# Patient Record
Sex: MALE | Race: WHITE | NOT HISPANIC OR LATINO | Employment: OTHER | URBAN - METROPOLITAN AREA
[De-identification: names, ages, dates, MRNs, and addresses within clinical notes are randomized per-mention and may not be internally consistent; named-entity substitution may affect disease eponyms.]

---

## 2017-02-23 ENCOUNTER — TELEPHONE (OUTPATIENT)
Dept: PAIN MEDICINE | Facility: CLINIC | Age: 57
End: 2017-02-23

## 2017-02-23 NOTE — TELEPHONE ENCOUNTER
----- Message from Martha Phelps sent at 2/22/2017 11:46 AM CST -----  Patient is requesting a sooner appt than i can offer please call [phone] (home)

## 2017-03-22 ENCOUNTER — OFFICE VISIT (OUTPATIENT)
Dept: PAIN MEDICINE | Facility: CLINIC | Age: 57
End: 2017-03-22
Payer: MEDICARE

## 2017-03-22 VITALS
RESPIRATION RATE: 18 BRPM | DIASTOLIC BLOOD PRESSURE: 67 MMHG | WEIGHT: 197 LBS | HEART RATE: 54 BPM | SYSTOLIC BLOOD PRESSURE: 103 MMHG | HEIGHT: 73 IN | BODY MASS INDEX: 26.11 KG/M2

## 2017-03-22 DIAGNOSIS — G89.4 CHRONIC PAIN DISORDER: ICD-10-CM

## 2017-03-22 DIAGNOSIS — M96.1 POSTLAMINECTOMY SYNDROME OF LUMBAR REGION: ICD-10-CM

## 2017-03-22 DIAGNOSIS — F11.20 UNCOMPLICATED OPIOID DEPENDENCE: ICD-10-CM

## 2017-03-22 DIAGNOSIS — M51.36 DDD (DEGENERATIVE DISC DISEASE), LUMBAR: Primary | ICD-10-CM

## 2017-03-22 PROCEDURE — 99204 OFFICE O/P NEW MOD 45 MIN: CPT | Mod: S$GLB,,, | Performed by: ANESTHESIOLOGY

## 2017-03-22 PROCEDURE — 1160F RVW MEDS BY RX/DR IN RCRD: CPT | Mod: S$GLB,,, | Performed by: ANESTHESIOLOGY

## 2017-03-22 PROCEDURE — 99999 PR PBB SHADOW E&M-EST. PATIENT-LVL III: CPT | Mod: PBBFAC,,, | Performed by: ANESTHESIOLOGY

## 2017-03-22 RX ORDER — NITROGLYCERIN 0.4 MG/1
TABLET SUBLINGUAL
Refills: 99 | COMMUNITY
Start: 2016-12-20 | End: 2019-02-06 | Stop reason: CLARIF

## 2017-03-22 RX ORDER — METOPROLOL TARTRATE 50 MG/1
TABLET ORAL
Refills: 3 | COMMUNITY
Start: 2017-03-20

## 2017-03-22 RX ORDER — LISINOPRIL 10 MG/1
TABLET ORAL
Refills: 3 | COMMUNITY
Start: 2017-03-20 | End: 2018-07-26

## 2017-03-22 RX ORDER — OXYCODONE AND ACETAMINOPHEN 10; 325 MG/1; MG/1
1 TABLET ORAL EVERY 8 HOURS PRN
Qty: 90 TABLET | Refills: 0 | Status: SHIPPED | OUTPATIENT
Start: 2017-03-22 | End: 2017-04-21

## 2017-03-22 RX ORDER — CLONAZEPAM 1 MG/1
1 TABLET ORAL 3 TIMES DAILY
Refills: 0 | COMMUNITY
Start: 2017-01-03 | End: 2018-07-26

## 2017-03-22 RX ORDER — OXYCODONE HYDROCHLORIDE 30 MG/1
30 TABLET ORAL 4 TIMES DAILY
Refills: 0 | COMMUNITY
Start: 2017-01-03 | End: 2017-03-22

## 2017-03-22 RX ORDER — SIMVASTATIN 80 MG/1
TABLET, FILM COATED ORAL
Refills: 3 | COMMUNITY
Start: 2017-03-20

## 2017-03-22 RX ORDER — POTASSIUM CHLORIDE 750 MG/1
TABLET, EXTENDED RELEASE ORAL
Refills: 3 | COMMUNITY
Start: 2017-03-20

## 2017-03-22 RX ORDER — CLOPIDOGREL BISULFATE 75 MG/1
TABLET ORAL
Refills: 3 | COMMUNITY
Start: 2017-03-20

## 2017-03-22 RX ORDER — ALPRAZOLAM 2 MG/1
2 TABLET ORAL 3 TIMES DAILY PRN
COMMUNITY
End: 2018-07-26

## 2017-03-22 RX ORDER — GABAPENTIN 600 MG/1
TABLET ORAL
Refills: 3 | COMMUNITY
Start: 2017-03-20

## 2017-03-22 RX ORDER — FLUOXETINE HYDROCHLORIDE 20 MG/1
CAPSULE ORAL
Refills: 3 | COMMUNITY
Start: 2017-03-20 | End: 2018-07-26

## 2017-03-22 RX ORDER — HYDROCODONE BITARTRATE AND ACETAMINOPHEN 10; 325 MG/1; MG/1
1 TABLET ORAL EVERY 4 HOURS PRN
Refills: 0 | COMMUNITY
Start: 2017-02-13 | End: 2017-03-22

## 2017-03-22 RX ORDER — PROMETHAZINE HYDROCHLORIDE 6.25 MG/5ML
SYRUP ORAL
Refills: 0 | COMMUNITY
Start: 2017-02-13 | End: 2018-07-26
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
## 2017-03-22 NOTE — MR AVS SNAPSHOT
[facility]  [address] [place] [address]  [address]  [facility] [place] [address]  Phone: [phone]                  Jose Miguel Verma   3/22/2017 2:20 PM   Office Visit    Description:  Male : 1960   Provider:  Arik Bender MD   Department:  [facility]           Reason for Visit     Consult     Hip Pain     Back Pain     Neck Pain           Diagnoses this Visit        Comments    DDD (degenerative disc disease), lumbar    -  Primary     Postlaminectomy syndrome of lumbar region                To Do List           Future Appointments        Provider Department Dept Phone    2017 11:20 AM MD [facility] [phone]      Goals (5 Years of Data)     None       These Medications        Disp Refills Start End    oxycodone-acetaminophen (PERCOCET)  mg per tablet 90 tablet 0 3/22/2017 2017    Take 1 tablet by mouth every 8 (eight) hours as needed for Pain. - Oral    Pharmacy: [facility] [address] #: [phone]         [facility] On Call     [facility] On Call Nurse Care Line -  Assistance  Registered nurses in the [facility] On Call Center provide clinical advisement, health education, appointment booking, and other advisory services.  Call for this free service at [phone].             Medications           Message regarding Medications     Verify the changes and/or additions to your medication regime listed below are the same as discussed with your clinician today.  If any of these changes or additions are incorrect, please notify your healthcare provider.        START taking these NEW medications        Refills    oxycodone-acetaminophen (PERCOCET)  mg per tablet 0    Sig: Take 1 tablet by mouth every 8 (eight) hours as needed for Pain.    Class: Print    Route: Oral      STOP taking these medications     oxycodone (ROXICODONE) 30 MG Tab Take 30 mg by mouth 4 (four) times daily.  "   OPANA ER 30 mg TR12 Take 1 tablet by mouth 2 (two) times daily.    hydrocodone-acetaminophen 10-325mg (NORCO)  mg Tab Take 1 tablet by mouth every 4 (four) hours as needed. for pain.           Verify that the below list of medications is an accurate representation of the medications you are currently taking.  If none reported, the list may be blank. If incorrect, please contact your healthcare provider. Carry this list with you in case of emergency.           Current Medications     alprazolam (XANAX) 2 MG Tab Take 2 mg by mouth 3 (three) times daily as needed.    clopidogrel (PLAVIX) 75 mg tablet Take 1 Tablet (75 mg) by mouth daily.    fluoxetine (PROZAC) 20 MG capsule Take 1 Capsule (20 mg) by mouth daily.    gabapentin (NEURONTIN) 600 MG tablet Take 1 Tablet (600 mg) by mouth 3 times daily.    lisinopril 10 MG tablet Take 1 Tablet (10 mg) by mouth daily.    metoprolol tartrate (LOPRESSOR) 50 MG tablet Take 1 Tablet (50 mg) by mouth daily.    NITROSTAT 0.4 mg SL tablet Place 1 Tablet (0.4 mg) under tongue every 5 minutes as needed for Chest Pain.    potassium chloride SA (K-DUR,KLOR-CON) 10 MEQ tablet Take 1 Tablet (10 mEq) by mouth daily.    promethazine (PHENERGAN) 6.25 mg/5 mL syrup Take 10 mL (12.5 mg) by mouth every 6 hours as needed for Nausea/Emesis.    simvastatin (ZOCOR) 80 MG tablet Take 1 Tablet (80 mg) by mouth daily.    clonazePAM (KLONOPIN) 1 MG tablet Take 1 mg by mouth 3 (three) times daily.    oxycodone-acetaminophen (PERCOCET)  mg per tablet Take 1 tablet by mouth every 8 (eight) hours as needed for Pain.           Clinical Reference Information           Your Vitals Were     BP Pulse Resp Height Weight BMI    103/67 (BP Location: Right arm, Patient Position: Sitting) 54 18 6' 1" (1.854 m) 89.4 kg (197 lb) 25.99 kg/m2      Blood Pressure          Most Recent Value    BP  103/67      Allergies as of 3/22/2017     No Known Allergies      Immunizations Administered on Date of " Encounter - 3/22/2017     None      Orders Placed During Today's Visit     Future Labs/Procedures Expected by Expires    MRI Lumbar Spine W WO Cont  3/22/2017 3/23/2018      [facility] Sign-Up     Activating your [facility] account is as easy as 1-2-3!     1) Visit [email or web address], select Sign Up Now, enter this activation code and your date of birth, then select Next.  [email or web address]  Expires: 5/6/2017  2:05 PM      2) Create a username and password to use when you visit [facility] in the future and select a security question in case you lose your password and select Next.    3) Enter your e-mail address and click Sign Up!    Additional Information  If you have questions, please e-mail [email or web address] or call [phone] to talk to our [facility] staff. Remember, [facility] is NOT to be used for urgent needs. For medical emergencies, dial 911.         Smoking Cessation     If you would like to quit smoking:   You may be eligible for free services if you are a [place] resident and started smoking cigarettes before September 1, 1988.  Call the Smoking Cessation Trust ([facility]) toll free at (266) 204-9992 or (776) 310-2626.   Call 3-968-QUIT-NOW if you do not meet the above criteria.            Language Assistance Services     ATTENTION: Language assistance services are available, free of charge. Please call [phone].      ATENCIÓN: Si habla español, tiene a major disposición servicios gratuitos de asistencia lingüística. Llame al [phone].     CHÚ Ý: N?u b?n nói Ti?ng Vi?t, có các d?ch v? h? tr? ngôn ng? mi?n phí dành cho b?n. G?i s? [phone].         [facility] complies with applicable Federal civil rights laws and does not discriminate on the basis of race, color, national origin, age, disability, or sex.

## 2017-03-29 ENCOUNTER — TELEPHONE (OUTPATIENT)
Dept: PAIN MEDICINE | Facility: CLINIC | Age: 57
End: 2017-03-29

## 2017-03-29 NOTE — TELEPHONE ENCOUNTER
Patient called back stating that he found his Rx. Informed patient that he can take that Rx to the pharmacy to have them fill it. Patient voiced understanding.

## 2017-03-29 NOTE — TELEPHONE ENCOUNTER
Informed Pharmacist that patient was given Rx at his visit on 3/22/17. Attempted to contact patient phone continues to ring and then goes to a busy signal. Unable to leave voicemail.

## 2017-03-29 NOTE — TELEPHONE ENCOUNTER
----- Message from Corky Nick sent at 3/29/2017  1:55 PM CDT -----  Contact: self  Patient need percocet please send to [facility] any question please call back at [phone]    [facility] - [address] [address]  Phone: [phone] Fax: [phone]

## 2017-03-29 NOTE — TELEPHONE ENCOUNTER
----- Message from Jacksridevi Zain sent at 3/28/2017  4:32 PM CDT -----  Contact: Patient  The pharmacy did not receive the e-script can you please send it in again for the medication, oxycodone-acetaminophen (PERCOCET)  mg per tablet.  Any questions, please call [phone].  Thank you      [address] [address]  Phone: [phone] Fax: [phone]

## 2018-07-26 PROBLEM — K92.1 HEMATOCHEZIA: Status: ACTIVE | Noted: 2018-07-26

## 2018-07-26 PROBLEM — I95.1 ORTHOSTATIC HYPOTENSION: Status: ACTIVE | Noted: 2018-07-26

## 2018-07-26 PROBLEM — E78.5 HYPERLIPIDEMIA: Status: ACTIVE | Noted: 2018-07-26

## 2018-07-26 PROBLEM — I25.10 CORONARY ARTERY DISEASE INVOLVING NATIVE CORONARY ARTERY OF NATIVE HEART WITHOUT ANGINA PECTORIS: Status: ACTIVE | Noted: 2018-07-26

## 2018-07-26 PROBLEM — K92.2 ACUTE LOWER GI BLEEDING: Status: ACTIVE | Noted: 2018-07-26

## 2018-07-26 PROBLEM — I50.22 CHRONIC SYSTOLIC CONGESTIVE HEART FAILURE: Status: ACTIVE | Noted: 2018-07-26

## 2018-07-30 ENCOUNTER — TELEPHONE (OUTPATIENT)
Dept: GASTROENTEROLOGY | Facility: CLINIC | Age: 58
End: 2018-07-30

## 2018-07-30 NOTE — TELEPHONE ENCOUNTER
----- Message from Kim Patel sent at 7/30/2018 10:31 AM CDT -----  Contact: Margarita with [facility] with [facility] is calling to let you know patient will need a cardiac clearance for his colonoscopy today. Please call Margarita at [phone]. Thanks!

## 2018-08-01 ENCOUNTER — TELEPHONE (OUTPATIENT)
Dept: GASTROENTEROLOGY | Facility: CLINIC | Age: 58
End: 2018-08-01

## 2018-08-01 NOTE — TELEPHONE ENCOUNTER
Pt has been scheduled for a f/u office appt with the NP Leonarda Moon. She see's the hospital f/u's for the physicians. Tried calling number below no answer. Could not leave message.

## 2018-08-01 NOTE — TELEPHONE ENCOUNTER
----- Message from Charles Riojas sent at 8/1/2018 11:11 AM CDT -----  Contact: Maryam/[facility]  Maryam called in and stated patient had colonoscopy on 7/31/18 and also EGD on 7/27/18 at [facility].  Maryam stated patient needs to f/u in 2 weeks.  Patient can be called at home to schedule at [phone].  Next available was 9/7/18

## 2018-08-17 ENCOUNTER — TELEPHONE (OUTPATIENT)
Dept: SPINE | Facility: CLINIC | Age: 58
End: 2018-08-17

## 2018-08-17 NOTE — TELEPHONE ENCOUNTER
Returned call, got voicemail, and left a message letting Mr. Verma know Aisha did not call him yesterday.

## 2019-02-08 PROBLEM — I25.2 OLD MYOCARDIAL INFARCTION: Status: ACTIVE | Noted: 2019-02-08

## 2023-08-29 ENCOUNTER — TELEPHONE (OUTPATIENT)
Dept: NEUROLOGY | Facility: CLINIC | Age: 63
End: 2023-08-29
Payer: MEDICARE

## 2023-08-29 NOTE — TELEPHONE ENCOUNTER
----- Message from Fer Pruitt sent at 8/29/2023  3:18 PM CDT -----  Regarding: appt  Contact: LEVAR FLORIAN [[id number]]  Type:  Sooner Appointment Request    Caller is requesting a sooner appointment.  Caller declined first available appointment listed below.  Caller will not accept being placed on the waitlist and is requesting a message be sent to doctor.    Name of Caller:  Levar    When is the first available appointment?  Dept book    Symptoms:  Hosp f/u 1 week [facility] MG, DC 8/29    Best Call Back Number:  [phone]    Additional Information:  Please call to advise.

## 2023-08-29 NOTE — TELEPHONE ENCOUNTER
Spoke with the pt, appt scheduled with Dr. Urbina on 8/31/23 at 1100.  Pt state's, he was diagnosed with MG at [facility].  Instructed to pt to get all medical records and imaging for his appt.  He v/u and said, he will bring his medical records from the hospital stay.

## 2023-08-29 NOTE — TELEPHONE ENCOUNTER
----- Message from Bill Daly sent at 2023 11:27 AM CDT -----  [facility] hosp f/u discharge MG diagnosis teleappt in \[facility] [place] [facility]\""   Type:  HOSP F/U Appointment Request    Caller is requesting a HOSP F/U appointment.      Name of Caller:  DESIREE Scheduling (discharge dept)    When is the first available appointment?  None in time frame needed.    Symptoms:  1w hosp f/u discharge MG diagnosed teleappt in [facility]     Best Call Back Number:  [phone] / ALT Contact Brother Ronnie [phone]     Additional Information:  PT being sent home with pyridostigmine 30mg before meals. Getting loading dose of Methel-Prednizone. Also getting tapering dose of prednizone starting at 30mg down to 10mg.

## 2023-08-29 NOTE — TELEPHONE ENCOUNTER
----- Message from Tasia Anil sent at 8/29/2023  3:12 PM CDT -----  Contact: Self  Type: Needs Medical Advice    Who Called:  Patient  What is this regarding?:  He was diagnosed with deandre gloevrs and needs a hosp f/up.  Best Call Back Number:  [phone]  Additional Information:  Please call the patient back at the phone number listed above to advise. Thank you!

## 2023-08-29 NOTE — TELEPHONE ENCOUNTER
----- Message from Bill Daly sent at 2023  3:03 PM CDT -----  [facility] Hosp f/u new dx MG Discharged 23 from [facility]  Contact: matt at [phone]  Type:  HOSP F/U Appointment Request    Caller is requesting a HOSP F/U appointment.      Name of Caller:  Matt    When is the first available appointment?  None in time frame needed.    Symptoms:  1w Hosp f/u new dx MG Discharged 23 from [facility]    Best Call Back Number:  [phone]    Additional Information:

## 2023-08-31 ENCOUNTER — OFFICE VISIT (OUTPATIENT)
Dept: NEUROLOGY | Facility: CLINIC | Age: 63
End: 2023-08-31
Payer: MEDICARE

## 2023-08-31 VITALS
RESPIRATION RATE: 18 BRPM | BODY MASS INDEX: 28.14 KG/M2 | HEIGHT: 73 IN | WEIGHT: 212.31 LBS | SYSTOLIC BLOOD PRESSURE: 143 MMHG | DIASTOLIC BLOOD PRESSURE: 83 MMHG | HEART RATE: 66 BPM

## 2023-08-31 DIAGNOSIS — G70.00 MYASTHENIA GRAVIS: Primary | ICD-10-CM

## 2023-08-31 PROCEDURE — 3008F PR BODY MASS INDEX (BMI) DOCUMENTED: ICD-10-PCS | Mod: CPTII,S$GLB,, | Performed by: PSYCHIATRY & NEUROLOGY

## 2023-08-31 PROCEDURE — 99999 PR PBB SHADOW E&M-EST. PATIENT-LVL IV: CPT | Mod: PBBFAC,,, | Performed by: PSYCHIATRY & NEUROLOGY

## 2023-08-31 PROCEDURE — 4010F PR ACE/ARB THEARPY RXD/TAKEN: ICD-10-PCS | Mod: CPTII,S$GLB,, | Performed by: PSYCHIATRY & NEUROLOGY

## 2023-08-31 PROCEDURE — 3079F PR MOST RECENT DIASTOLIC BLOOD PRESSURE 80-89 MM HG: ICD-10-PCS | Mod: CPTII,S$GLB,, | Performed by: PSYCHIATRY & NEUROLOGY

## 2023-08-31 PROCEDURE — 4010F ACE/ARB THERAPY RXD/TAKEN: CPT | Mod: CPTII,S$GLB,, | Performed by: PSYCHIATRY & NEUROLOGY

## 2023-08-31 PROCEDURE — 1160F RVW MEDS BY RX/DR IN RCRD: CPT | Mod: CPTII,S$GLB,, | Performed by: PSYCHIATRY & NEUROLOGY

## 2023-08-31 PROCEDURE — 3008F BODY MASS INDEX DOCD: CPT | Mod: CPTII,S$GLB,, | Performed by: PSYCHIATRY & NEUROLOGY

## 2023-08-31 PROCEDURE — 99205 PR OFFICE/OUTPT VISIT, NEW, LEVL V, 60-74 MIN: ICD-10-PCS | Mod: S$GLB,,, | Performed by: PSYCHIATRY & NEUROLOGY

## 2023-08-31 PROCEDURE — 1159F PR MEDICATION LIST DOCUMENTED IN MEDICAL RECORD: ICD-10-PCS | Mod: CPTII,S$GLB,, | Performed by: PSYCHIATRY & NEUROLOGY

## 2023-08-31 PROCEDURE — 99205 OFFICE O/P NEW HI 60 MIN: CPT | Mod: S$GLB,,, | Performed by: PSYCHIATRY & NEUROLOGY

## 2023-08-31 PROCEDURE — 99999 PR PBB SHADOW E&M-EST. PATIENT-LVL IV: ICD-10-PCS | Mod: PBBFAC,,, | Performed by: PSYCHIATRY & NEUROLOGY

## 2023-08-31 PROCEDURE — 1159F MED LIST DOCD IN RCRD: CPT | Mod: CPTII,S$GLB,, | Performed by: PSYCHIATRY & NEUROLOGY

## 2023-08-31 PROCEDURE — 1160F PR REVIEW ALL MEDS BY PRESCRIBER/CLIN PHARMACIST DOCUMENTED: ICD-10-PCS | Mod: CPTII,S$GLB,, | Performed by: PSYCHIATRY & NEUROLOGY

## 2023-08-31 PROCEDURE — 3077F PR MOST RECENT SYSTOLIC BLOOD PRESSURE >= 140 MM HG: ICD-10-PCS | Mod: CPTII,S$GLB,, | Performed by: PSYCHIATRY & NEUROLOGY

## 2023-08-31 PROCEDURE — 3077F SYST BP >= 140 MM HG: CPT | Mod: CPTII,S$GLB,, | Performed by: PSYCHIATRY & NEUROLOGY

## 2023-08-31 PROCEDURE — 3079F DIAST BP 80-89 MM HG: CPT | Mod: CPTII,S$GLB,, | Performed by: PSYCHIATRY & NEUROLOGY

## 2023-08-31 RX ORDER — SILDENAFIL 100 MG/1
100 TABLET, FILM COATED ORAL DAILY PRN
COMMUNITY
Start: 2023-02-28

## 2023-08-31 RX ORDER — DIAZEPAM 10 MG/1
10 TABLET ORAL DAILY PRN
COMMUNITY
Start: 2022-09-14

## 2023-08-31 RX ORDER — AMLODIPINE BESYLATE 5 MG/1
5 TABLET ORAL
COMMUNITY
Start: 2023-08-29 | End: 2024-02-01

## 2023-08-31 RX ORDER — PREDNISONE 10 MG/1
TABLET ORAL
COMMUNITY
Start: 2023-08-29 | End: 2024-02-01
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
## 2023-08-31 NOTE — PATIENT INSTRUCTIONS
At this point your symptoms are most suggestive of Myasthenia Gravis, most likely ocular type.  Your confirmation labs are not back yet.  Most people who have ocular myasthenia and do not develop generalized symptoms for 2-3 years, will never develop generalized symptoms.      Would definitely resume metoprolol given your cardiac history.  Follow up with your cardiologist.    Pyridostigmine is used to manage symptoms of double vision, drooping eyelids, slurred speech or changes in swallowing related to Myasthenia Gravis.  This medication is typically supplied in a 60mg pill.  It is used make the symptoms go away.  It usually starts working in about 20 minutes and will last 4-6 hours.  You can start with 1/2 tablet or 30mg, then if you need to try a whole tablet or 60mg. You can take this medication every 4 to 6 hours as needed.    Potential side effects are stomach cramps, diarrhea, drooling or excessive tearing of the eyes.  Most people find these side effects occur when taking too much medicine or taking the medicine too close together.  It will usually go away after a couple of hours.

## 2023-09-05 ENCOUNTER — TELEPHONE (OUTPATIENT)
Dept: NEUROLOGY | Facility: CLINIC | Age: 63
End: 2023-09-05
Payer: MEDICARE

## 2023-09-05 DIAGNOSIS — H53.2 DOUBLE VISION: Primary | ICD-10-CM

## 2023-09-05 DIAGNOSIS — R47.81 SLURRED SPEECH: ICD-10-CM

## 2023-09-05 RX ORDER — PYRIDOSTIGMINE BROMIDE 30 MG/1
30 TABLET ORAL
Qty: 120 TABLET | Refills: 1 | Status: CANCELLED | OUTPATIENT
Start: 2023-09-05 | End: 2024-09-04

## 2023-09-05 RX ORDER — PYRIDOSTIGMINE BROMIDE 30 MG/1
60 TABLET ORAL
COMMUNITY
Start: 2023-08-29 | End: 2023-09-06 | Stop reason: SDUPTHER

## 2023-09-05 NOTE — TELEPHONE ENCOUNTER
----- Message from Ferwiliam Pruitt sent at 9/5/2023 10:06 AM CDT -----  Regarding: advice  Contact: LEVAR FLORIAN [[id number]]  Type: Needs Medical Advice  Who Called:  levar    Symptoms (please be specific):  Myastinia Gravis    How long has patient had these symptoms:  na    Pharmacy name and phone #:    [facility] [address] [address]  Phone: [phone] Fax: [phone]      Best Call Back Number: [phone] (home)     Additional Information: Pt can't get Pyridostigmine for MG. Asking for alternative. Please call to advise.

## 2023-09-06 DIAGNOSIS — H53.2 DOUBLE VISION: Primary | ICD-10-CM

## 2023-09-06 DIAGNOSIS — R47.81 SLURRED SPEECH: ICD-10-CM

## 2023-09-13 ENCOUNTER — TELEPHONE (OUTPATIENT)
Dept: NEUROLOGY | Facility: CLINIC | Age: 63
End: 2023-09-13
Payer: MEDICARE

## 2023-09-15 RX ORDER — PYRIDOSTIGMINE BROMIDE 30 MG/1
60 TABLET ORAL
Qty: 120 TABLET | Refills: 3 | Status: SHIPPED | OUTPATIENT
Start: 2023-09-15 | End: 2024-02-01

## 2023-09-20 ENCOUNTER — TELEPHONE (OUTPATIENT)
Dept: NEUROLOGY | Facility: CLINIC | Age: 63
End: 2023-09-20
Payer: MEDICARE

## 2023-09-20 NOTE — TELEPHONE ENCOUNTER
Returned call to patient to clarify what was needed. Dr. Urbina did not order any x rays or testing. Patient states he does not need them now.

## 2023-09-20 NOTE — TELEPHONE ENCOUNTER
----- Message from Sudha Conner, Patient Care Assistant sent at 9/20/2023 10:37 AM CDT -----  Contact: Pt  Type: Needs Medical Advice    Who Called: Pt  Best Call Back Number: [phone]  Inquiry/Question: Pt is calling to get results of all pt xrays and testing prior to dental work. Dr Zakia Martin. Please advise Thank you~

## 2023-09-28 ENCOUNTER — TELEPHONE (OUTPATIENT)
Dept: NEUROLOGY | Facility: CLINIC | Age: 63
End: 2023-09-28
Payer: MEDICARE

## 2023-09-28 NOTE — TELEPHONE ENCOUNTER
Spoke to the pt, to confirm his appt with Dr. Urbina on 10/3/23 at 0930.  He stated he has an appt with his cardiologist in [place] at 0800 that morning, and he would come after that appt.  Informed him, if he was 15 minutes late for the appt he would need to reschedule. Offered to reschedule his appt.  He stated, he would call and reschedule his cardiology appt.

## 2023-10-03 ENCOUNTER — OFFICE VISIT (OUTPATIENT)
Dept: NEUROLOGY | Facility: CLINIC | Age: 63
End: 2023-10-03
Payer: MEDICARE

## 2023-10-03 VITALS
SYSTOLIC BLOOD PRESSURE: 135 MMHG | HEART RATE: 73 BPM | BODY MASS INDEX: 28.98 KG/M2 | WEIGHT: 219.69 LBS | DIASTOLIC BLOOD PRESSURE: 79 MMHG

## 2023-10-03 DIAGNOSIS — R51.9 CHRONIC DAILY HEADACHE: ICD-10-CM

## 2023-10-03 DIAGNOSIS — G70.00 OCULAR MYASTHENIA GRAVIS: Primary | ICD-10-CM

## 2023-10-03 PROCEDURE — 3078F PR MOST RECENT DIASTOLIC BLOOD PRESSURE < 80 MM HG: ICD-10-PCS | Mod: CPTII,S$GLB,, | Performed by: PSYCHIATRY & NEUROLOGY

## 2023-10-03 PROCEDURE — 3075F SYST BP GE 130 - 139MM HG: CPT | Mod: CPTII,S$GLB,, | Performed by: PSYCHIATRY & NEUROLOGY

## 2023-10-03 PROCEDURE — 1160F PR REVIEW ALL MEDS BY PRESCRIBER/CLIN PHARMACIST DOCUMENTED: ICD-10-PCS | Mod: CPTII,S$GLB,, | Performed by: PSYCHIATRY & NEUROLOGY

## 2023-10-03 PROCEDURE — 3078F DIAST BP <80 MM HG: CPT | Mod: CPTII,S$GLB,, | Performed by: PSYCHIATRY & NEUROLOGY

## 2023-10-03 PROCEDURE — 1159F PR MEDICATION LIST DOCUMENTED IN MEDICAL RECORD: ICD-10-PCS | Mod: CPTII,S$GLB,, | Performed by: PSYCHIATRY & NEUROLOGY

## 2023-10-03 PROCEDURE — 99213 OFFICE O/P EST LOW 20 MIN: CPT | Mod: S$GLB,,, | Performed by: PSYCHIATRY & NEUROLOGY

## 2023-10-03 PROCEDURE — 3075F PR MOST RECENT SYSTOLIC BLOOD PRESS GE 130-139MM HG: ICD-10-PCS | Mod: CPTII,S$GLB,, | Performed by: PSYCHIATRY & NEUROLOGY

## 2023-10-03 PROCEDURE — 4010F ACE/ARB THERAPY RXD/TAKEN: CPT | Mod: CPTII,S$GLB,, | Performed by: PSYCHIATRY & NEUROLOGY

## 2023-10-03 PROCEDURE — 1160F RVW MEDS BY RX/DR IN RCRD: CPT | Mod: CPTII,S$GLB,, | Performed by: PSYCHIATRY & NEUROLOGY

## 2023-10-03 PROCEDURE — 3008F PR BODY MASS INDEX (BMI) DOCUMENTED: ICD-10-PCS | Mod: CPTII,S$GLB,, | Performed by: PSYCHIATRY & NEUROLOGY

## 2023-10-03 PROCEDURE — 99999 PR PBB SHADOW E&M-EST. PATIENT-LVL IV: ICD-10-PCS | Mod: PBBFAC,,, | Performed by: PSYCHIATRY & NEUROLOGY

## 2023-10-03 PROCEDURE — 3008F BODY MASS INDEX DOCD: CPT | Mod: CPTII,S$GLB,, | Performed by: PSYCHIATRY & NEUROLOGY

## 2023-10-03 PROCEDURE — 1159F MED LIST DOCD IN RCRD: CPT | Mod: CPTII,S$GLB,, | Performed by: PSYCHIATRY & NEUROLOGY

## 2023-10-03 PROCEDURE — 99999 PR PBB SHADOW E&M-EST. PATIENT-LVL IV: CPT | Mod: PBBFAC,,, | Performed by: PSYCHIATRY & NEUROLOGY

## 2023-10-03 PROCEDURE — 99213 PR OFFICE/OUTPT VISIT, EST, LEVL III, 20-29 MIN: ICD-10-PCS | Mod: S$GLB,,, | Performed by: PSYCHIATRY & NEUROLOGY

## 2023-10-03 PROCEDURE — 4010F PR ACE/ARB THEARPY RXD/TAKEN: ICD-10-PCS | Mod: CPTII,S$GLB,, | Performed by: PSYCHIATRY & NEUROLOGY
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
## 2023-10-03 NOTE — PATIENT INSTRUCTIONS
You may have seronegative MG.  This is where you do not have antibodies but you have some MG symptoms.  It is also possible the double vision is actually due to an eye issue.  Some people will develop weakness in certain eye muscles with age.  This is corrected with special glasses.  But if you feel mestinon (pyridostigmine) makes the double vision go away then MG is more likely.     Pyridostigmine is used to manage symptoms of double vision, drooping eyelids, slurred speech or changes in swallowing related to Myasthenia Gravis.  This medication is typically supplied in a 60mg pill.  It is used make the symptoms go away.  It usually starts working in about 20 minutes and will last 4-6 hours.  You can start with 1/2 tablet or 30mg, then if you need to try a whole tablet or 60mg. You can take this medication every 4 to 6 hours as needed.     Potential side effects are stomach cramps, diarrhea, drooling or excessive tearing of the eyes.  Most people find these side effects occur when taking too much medicine or taking the medicine too close together.  It will usually go away after a couple of hours.    For your daily headaches, you have been referred to headache clinic.

## 2023-10-04 ENCOUNTER — TELEPHONE (OUTPATIENT)
Dept: NEUROLOGY | Facility: CLINIC | Age: 63
End: 2023-10-04
Payer: MEDICARE

## 2023-10-04 NOTE — TELEPHONE ENCOUNTER
Spoke to the pt, appt scheduled on 2/1/24 at 0800 with Nikita Chou for daily headaches.  Date, time and location discussed.

## 2024-02-01 ENCOUNTER — OFFICE VISIT (OUTPATIENT)
Dept: NEUROLOGY | Facility: CLINIC | Age: 64
End: 2024-02-01
Payer: MEDICARE

## 2024-02-01 VITALS
TEMPERATURE: 98 F | SYSTOLIC BLOOD PRESSURE: 169 MMHG | HEIGHT: 73 IN | HEART RATE: 87 BPM | WEIGHT: 218.94 LBS | BODY MASS INDEX: 29.02 KG/M2 | RESPIRATION RATE: 17 BRPM | DIASTOLIC BLOOD PRESSURE: 100 MMHG

## 2024-02-01 DIAGNOSIS — G43.719 INTRACTABLE CHRONIC MIGRAINE WITHOUT AURA AND WITHOUT STATUS MIGRAINOSUS: Primary | ICD-10-CM

## 2024-02-01 DIAGNOSIS — G70.00 OCULAR MYASTHENIA GRAVIS: ICD-10-CM

## 2024-02-01 DIAGNOSIS — R51.9 CHRONIC DAILY HEADACHE: ICD-10-CM

## 2024-02-01 DIAGNOSIS — G44.309 POST-TRAUMATIC HEADACHE, NOT INTRACTABLE, UNSPECIFIED CHRONICITY PATTERN: ICD-10-CM

## 2024-02-01 PROCEDURE — 1160F RVW MEDS BY RX/DR IN RCRD: CPT | Mod: CPTII,S$GLB,, | Performed by: PHYSICIAN ASSISTANT

## 2024-02-01 PROCEDURE — 3077F SYST BP >= 140 MM HG: CPT | Mod: CPTII,S$GLB,, | Performed by: PHYSICIAN ASSISTANT

## 2024-02-01 PROCEDURE — 99215 OFFICE O/P EST HI 40 MIN: CPT | Mod: S$GLB,,, | Performed by: PHYSICIAN ASSISTANT

## 2024-02-01 PROCEDURE — 99999 PR PBB SHADOW E&M-EST. PATIENT-LVL IV: CPT | Mod: PBBFAC,,, | Performed by: PHYSICIAN ASSISTANT

## 2024-02-01 PROCEDURE — 3008F BODY MASS INDEX DOCD: CPT | Mod: CPTII,S$GLB,, | Performed by: PHYSICIAN ASSISTANT

## 2024-02-01 PROCEDURE — 1159F MED LIST DOCD IN RCRD: CPT | Mod: CPTII,S$GLB,, | Performed by: PHYSICIAN ASSISTANT

## 2024-02-01 PROCEDURE — 3080F DIAST BP >= 90 MM HG: CPT | Mod: CPTII,S$GLB,, | Performed by: PHYSICIAN ASSISTANT
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
## 2024-03-05 ENCOUNTER — PROCEDURE VISIT (OUTPATIENT)
Dept: NEUROLOGY | Facility: CLINIC | Age: 64
End: 2024-03-05
Payer: MEDICARE

## 2024-03-05 ENCOUNTER — OFFICE VISIT (OUTPATIENT)
Dept: NEUROLOGY | Facility: CLINIC | Age: 64
End: 2024-03-05
Payer: MEDICARE

## 2024-03-05 VITALS
HEART RATE: 68 BPM | HEIGHT: 73 IN | WEIGHT: 218.94 LBS | RESPIRATION RATE: 17 BRPM | TEMPERATURE: 97 F | BODY MASS INDEX: 29.02 KG/M2 | SYSTOLIC BLOOD PRESSURE: 139 MMHG | DIASTOLIC BLOOD PRESSURE: 92 MMHG

## 2024-03-05 VITALS
SYSTOLIC BLOOD PRESSURE: 132 MMHG | DIASTOLIC BLOOD PRESSURE: 83 MMHG | WEIGHT: 218.94 LBS | BODY MASS INDEX: 28.88 KG/M2 | HEART RATE: 76 BPM

## 2024-03-05 DIAGNOSIS — H53.2 DOUBLE VISION: ICD-10-CM

## 2024-03-05 DIAGNOSIS — G43.719 INTRACTABLE CHRONIC MIGRAINE WITHOUT AURA AND WITHOUT STATUS MIGRAINOSUS: Primary | ICD-10-CM

## 2024-03-05 DIAGNOSIS — G70.00 OCULAR MYASTHENIA GRAVIS: Primary | ICD-10-CM

## 2024-03-05 PROCEDURE — 99214 OFFICE O/P EST MOD 30 MIN: CPT | Mod: 24,S$GLB,, | Performed by: PSYCHIATRY & NEUROLOGY

## 2024-03-05 PROCEDURE — 3079F DIAST BP 80-89 MM HG: CPT | Mod: CPTII,S$GLB,, | Performed by: PSYCHIATRY & NEUROLOGY

## 2024-03-05 PROCEDURE — 99999 PR PBB SHADOW E&M-EST. PATIENT-LVL IV: CPT | Mod: PBBFAC,,, | Performed by: PSYCHIATRY & NEUROLOGY

## 2024-03-05 PROCEDURE — 4010F ACE/ARB THERAPY RXD/TAKEN: CPT | Mod: CPTII,S$GLB,, | Performed by: PSYCHIATRY & NEUROLOGY

## 2024-03-05 PROCEDURE — 64615 CHEMODENERV MUSC MIGRAINE: CPT | Mod: S$GLB,,, | Performed by: PHYSICIAN ASSISTANT

## 2024-03-05 PROCEDURE — 3008F BODY MASS INDEX DOCD: CPT | Mod: CPTII,S$GLB,, | Performed by: PSYCHIATRY & NEUROLOGY

## 2024-03-05 PROCEDURE — 1160F RVW MEDS BY RX/DR IN RCRD: CPT | Mod: CPTII,S$GLB,, | Performed by: PSYCHIATRY & NEUROLOGY

## 2024-03-05 PROCEDURE — 1159F MED LIST DOCD IN RCRD: CPT | Mod: CPTII,S$GLB,, | Performed by: PSYCHIATRY & NEUROLOGY

## 2024-03-05 PROCEDURE — 3075F SYST BP GE 130 - 139MM HG: CPT | Mod: CPTII,S$GLB,, | Performed by: PSYCHIATRY & NEUROLOGY

## 2024-03-05 RX ORDER — PYRIDOSTIGMINE BROMIDE 60 MG/1
60 TABLET ORAL EVERY 6 HOURS PRN
Qty: 60 TABLET | Refills: 5 | Status: SHIPPED | OUTPATIENT
Start: 2024-03-05 | End: 2024-03-05 | Stop reason: SDUPTHER

## 2024-03-05 RX ORDER — PYRIDOSTIGMINE BROMIDE 60 MG/1
60 TABLET ORAL EVERY 6 HOURS PRN
Qty: 120 TABLET | Refills: 11 | Status: SHIPPED | OUTPATIENT
Start: 2024-03-05 | End: 2024-03-05 | Stop reason: SDUPTHER

## 2024-03-05 RX ORDER — PYRIDOSTIGMINE BROMIDE 60 MG/1
60 TABLET ORAL EVERY 6 HOURS PRN
Qty: 60 TABLET | Refills: 5 | Status: SHIPPED | OUTPATIENT
Start: 2024-03-05 | End: 2025-03-05
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
## 2024-03-05 NOTE — PROCEDURES
[facility]  [address] [place] [address]  Phone:[phone]  Fax: [phone]  Botox Visit, #1    Chief Complaint   Patient presents with    Botulinum Toxin Injection         A/P:        ICD-10-CM ICD-9-CM   1. Intractable chronic migraine without aura and without status migrainosus  G43.719 346.71     Botox for migraine  At baseline right eyebrow slightly higher than left     Historically: Patient meets the criteria for chronic migraine. In summary, He has headaches/migraines >15 days per month  and last >4 hours if untreated. Specifics of duration, frequency and strength are listed in office visit HPI's.  This pattern has continued for >3 months.  He has failed at least three preventive medications (full list of medications is listed in office notes of Therapies tried in past)  I have therefore recommended a trial of Botox via the PREEMPT protocol for migraine prophylaxis.We schedule regular follow up intervals to check on status.     PROCEDURE NOTE:  BOTOX injection is indicated for the prophylaxis of headaches in adult patients with chronic  migraine. Patient meets indications for BOTOX therapy.  Potential risks and benefits were reviewed. Side effects including, but not limited to, potential  systemic allergic reactions of the anaphylactic type as well as local injection site reactions of  blepharoptosis, diplopia, infection, bleeding, pain, redness and bruising were reviewed. The  potential for headaches and/or neck pain post procedure were reviewed.  The patient's questions were answered. The patient signed a consent form. Patient  understands that depending on their insurance carrier, there may be a copay for this treatment.  BOTOX was reconstituted using two 100 unit vials and diluted with 4 mL of sterile saline.  BOTOX was injected as per the PREEMPT trial injection paradigm with dose administered as 5  unit intramuscular (IM) injections per  site using a sterile, 30-gauge 0.5 inch needle as follows:  Muscle Dose, # of Sites   10 units divided in 2 sites  Procerus 5 units in 1 site  Frontalis 20 units divided in 4 sites  Temporalis 40 units divided in 8 sites  Occipitalis 30 units divided in 6 sites  Cervical paraspinal 20 units divided in 4 sites  Trapezius 30 units divided in 6 sites  Each site was cleaned with alcohol prior to injection. A total dose of 155 units were injected. 45  units were discarded/wasted.      The patient tolerated the procedure well with no immediate complications.  MEDICATION INFO:  NDC 0186-1099-81   Lot # [id number]  Exp 04/2026         Follow up in 3 months for repeat injection, we also have interspersed office visits scheduled to ensure efficacy of botox sessions/check on patient.       Tunde Chou MPA, PA-C  Attending available-Dr Larry MD           Personal Protective Equipment:    Personal Protective Equipment was used during this encounter including;    non latex gloves.     03/05/2024 9:25 AM    CC: Deng Segura MD, Eleanor Urbina DO

## 2024-03-05 NOTE — PATIENT INSTRUCTIONS
You have had several episodes of double vision.  Your workup for myasthenia gravis was negative.  There are some cases where people can have symptoms of myasthenia but not the antibodies.  These are very rare.      Because this has only been double vision and seems to be with specific scenarios it would be worth having an eye exam to see if there may be something else contributing to this.  You have been referred to the eye doctor to evaluate.    For now, will give you Mestinon (pyridostigmine) to try. This is used to hide the symptoms of myasthenia.  You can take 1/2 to 1 pill at the onset of double vision. Feel free to start with 1/2, if not better after 30 minutes, then take the other 1/2.  If the vision issue is related to myasthenia gravis, it would be expected to improve you vision in about 30 minutes.  This prescription was sent to your pharmacy.  Note that potential side effects can be stomach cramps or diarrhea.  It will go away after a couple hours if this happens to you.

## 2024-03-27 ENCOUNTER — PATIENT MESSAGE (OUTPATIENT)
Dept: OPHTHALMOLOGY | Facility: CLINIC | Age: 64
End: 2024-03-27
Payer: MEDICARE

## 2024-04-17 PROBLEM — I69.354 HEMIPLEGIA AND HEMIPARESIS FOLLOWING CEREBRAL INFARCTION AFFECTING LEFT NON-DOMINANT SIDE: Status: ACTIVE | Noted: 2024-04-17

## 2024-05-28 ENCOUNTER — TELEPHONE (OUTPATIENT)
Dept: NEUROLOGY | Facility: CLINIC | Age: 64
End: 2024-05-28
Payer: MEDICARE

## 2024-05-28 NOTE — TELEPHONE ENCOUNTER
----- Message from Fer Pruitt sent at 5/28/2024 12:02 PM CDT -----  Regarding: appt  Contact: LEVAR FLORIAN [[id number]]  Type:  Sooner Appointment Request    Caller is requesting a sooner appointment.      Name of Caller:  Levar    When is the first available appointment?  Dept book    Symptoms:  Botox    Would the patient rather a call back or a response via [facility]? Call    Best Call Back Number:  [phone] (home)     Additional Information:  Needs to reschedule between 1st and 5th due to hardship and trans. Please call to advise.

## 2024-06-14 ENCOUNTER — TELEPHONE (OUTPATIENT)
Dept: NEUROLOGY | Facility: CLINIC | Age: 64
End: 2024-06-14
Payer: MEDICARE

## 2024-06-14 NOTE — TELEPHONE ENCOUNTER
----- Message from Bill Daly sent at 6/14/2024  1:41 PM CDT -----  Regarding: reschedule botox  Contact: [phone] / matt  Type:  Reschedule Botox    Caller is requesting a BOTOX appointment.      Name of Caller:  matt    When is the first available appointment?  Dept Book    Procedure:  Botox    Best Call Back Number:  [phone]    Additional Information:  pt has memory issues b/c of stroke. Needs calls day before to remind him of appts.

## 2024-06-14 NOTE — TELEPHONE ENCOUNTER
----- Message from Magalie Morrissey sent at 6/14/2024  3:43 PM CDT -----  Regarding: advise  Contact: pt  Type: Needs Medical Advice  Who Called:  patient  Symptoms (please be specific):    How long has patient had these symptoms:    Pharmacy name and phone #:    Best Call Back Number: [phone]    Additional Information: harriett reed pt is returning ur call are u available MRN: [id number] LVEAR FLORIAN

## 2024-06-25 ENCOUNTER — TELEPHONE (OUTPATIENT)
Dept: NEUROLOGY | Facility: CLINIC | Age: 64
End: 2024-06-25
Payer: MEDICARE

## 2024-06-25 NOTE — TELEPHONE ENCOUNTER
Called patient and notified that delaying could possibly cause an increase in his headaches. Verbalized understanding. Patient stated that he will cancel for now and when he gets back he will call to reschedule.

## 2024-06-25 NOTE — TELEPHONE ENCOUNTER
----- Message from Leonarda Arshad sent at 6/25/2024 12:21 PM CDT -----  Contact: Self  Pt is calling in regards to his Botox appt that is scheduled on 07/02. Stated his son's thaty live in [place] have invited him to come stay with them for the 4th of July and come back around the 16th, and he wanted to know if it would affect his health to push the Botox appt back until after 7/16. Stated he already had to have this one rescheduled and does not want to mess everything up, but doesn't feel comfortable getting the Botox on 7/2 and driving to [place] right after. Can we please check on this and call pt back to advise at [phone]. Thank You.

## 2024-09-05 ENCOUNTER — OFFICE VISIT (OUTPATIENT)
Dept: NEUROLOGY | Facility: CLINIC | Age: 64
End: 2024-09-05
Payer: MEDICARE

## 2024-09-05 VITALS
DIASTOLIC BLOOD PRESSURE: 94 MMHG | HEART RATE: 83 BPM | BODY MASS INDEX: 28.39 KG/M2 | WEIGHT: 215.19 LBS | SYSTOLIC BLOOD PRESSURE: 160 MMHG | RESPIRATION RATE: 17 BRPM

## 2024-09-05 DIAGNOSIS — I69.354 HEMIPLEGIA AND HEMIPARESIS FOLLOWING CEREBRAL INFARCTION AFFECTING LEFT NON-DOMINANT SIDE: ICD-10-CM

## 2024-09-05 DIAGNOSIS — H91.90 HEARING LOSS, UNSPECIFIED HEARING LOSS TYPE, UNSPECIFIED LATERALITY: Primary | ICD-10-CM

## 2024-09-05 DIAGNOSIS — G70.00 OCULAR MYASTHENIA GRAVIS: ICD-10-CM

## 2024-09-05 PROCEDURE — 3008F BODY MASS INDEX DOCD: CPT | Mod: CPTII,S$GLB,, | Performed by: PSYCHIATRY & NEUROLOGY

## 2024-09-05 PROCEDURE — 3288F FALL RISK ASSESSMENT DOCD: CPT | Mod: CPTII,S$GLB,, | Performed by: PSYCHIATRY & NEUROLOGY

## 2024-09-05 PROCEDURE — 4010F ACE/ARB THERAPY RXD/TAKEN: CPT | Mod: CPTII,S$GLB,, | Performed by: PSYCHIATRY & NEUROLOGY

## 2024-09-05 PROCEDURE — 3080F DIAST BP >= 90 MM HG: CPT | Mod: CPTII,S$GLB,, | Performed by: PSYCHIATRY & NEUROLOGY

## 2024-09-05 PROCEDURE — 99214 OFFICE O/P EST MOD 30 MIN: CPT | Mod: S$GLB,,, | Performed by: PSYCHIATRY & NEUROLOGY

## 2024-09-05 PROCEDURE — G2211 COMPLEX E/M VISIT ADD ON: HCPCS | Mod: S$GLB,,, | Performed by: PSYCHIATRY & NEUROLOGY

## 2024-09-05 PROCEDURE — 3077F SYST BP >= 140 MM HG: CPT | Mod: CPTII,S$GLB,, | Performed by: PSYCHIATRY & NEUROLOGY

## 2024-09-05 PROCEDURE — 99999 PR PBB SHADOW E&M-EST. PATIENT-LVL III: CPT | Mod: PBBFAC,,, | Performed by: PSYCHIATRY & NEUROLOGY

## 2024-09-05 PROCEDURE — 1101F PT FALLS ASSESS-DOCD LE1/YR: CPT | Mod: CPTII,S$GLB,, | Performed by: PSYCHIATRY & NEUROLOGY

## 2024-09-05 RX ORDER — PREDNISONE 10 MG/1
TABLET ORAL
COMMUNITY
Start: 2024-03-15

## 2024-09-05 RX ORDER — IBUPROFEN 200 MG
1 TABLET ORAL
COMMUNITY
Start: 2024-05-30

## 2024-09-05 RX ORDER — ALENDRONATE SODIUM 70 MG/1
70 TABLET ORAL
COMMUNITY
Start: 2024-08-27

## 2024-09-05 RX ORDER — IBUPROFEN 800 MG/1
800 TABLET ORAL EVERY 6 HOURS
COMMUNITY
Start: 2024-06-10

## 2024-09-05 RX ORDER — METOPROLOL SUCCINATE 50 MG/1
1 TABLET, EXTENDED RELEASE ORAL EVERY MORNING
COMMUNITY
Start: 2024-03-07

## 2024-09-05 RX ORDER — TOPIRAMATE 100 MG/1
TABLET, FILM COATED ORAL
COMMUNITY
Start: 2024-07-03

## 2024-09-05 RX ORDER — TESTOSTERONE CYPIONATE 200 MG/ML
200 INJECTION, SOLUTION INTRAMUSCULAR
COMMUNITY
Start: 2024-04-23

## 2024-09-05 RX ORDER — PYRIDOSTIGMINE BROMIDE 60 MG/1
60 TABLET ORAL EVERY 6 HOURS PRN
Qty: 60 TABLET | Refills: 5 | Status: SHIPPED | OUTPATIENT
Start: 2024-09-05 | End: 2025-09-05

## 2024-09-05 RX ORDER — NEEDLES, DISPOSABLE 25GX5/8"
NEEDLE, DISPOSABLE MISCELLANEOUS
COMMUNITY
Start: 2024-06-06

## 2024-09-05 RX ORDER — PANTOPRAZOLE SODIUM 40 MG/1
40 TABLET, DELAYED RELEASE ORAL
COMMUNITY

## 2024-09-24 ENCOUNTER — PATIENT OUTREACH (OUTPATIENT)
Dept: ADMINISTRATIVE | Facility: OTHER | Age: 64
End: 2024-09-24
Payer: MEDICARE

## 2024-10-01 NOTE — PROGRESS NOTES
LPN spoke to patient/caregiver as per OPCM referral for: Pt would like to learn ASL    Does the patient consent to completing the assessment/enrollment: Yes  Does the patient consent for LPN to speak to a caregiver? No    Health Insurance Coverage:     Does the patient have adequate health insurance coverage? Yes  Education provided: Yes    PCP Follow-Up Appointments:    Does the patient have a primary care provider? yes -   Date of last PCP appointment?   Next PCP appointment:     Was patient provided with education surrounding PCP services/creating a medical home? yes -       Specialist Appointments:     Does the patient have a pending specialist referral? yes - ophthal  Does the patient have an upcoming specialist appointment? yes - neurology, cardio  Is the patient pregnant? N/A  Does the patient have a mental health provider? no       Home Medications:     Reviewed medication list with patient? No  Is the patient able to afford their medications? Yes      Recent lab results:  Blood Sugar:    Provided education: No  Blood Pressure:   Provided education: No        Social Determinants of Health (SDOH)    Patient's identified areas of need:      Education/Resources provided:          Home Health/DME:    Current Home Health: No  Patient has all healthcare equipment/supplies indicated: yes      Additional Documentation:   Spoke to patient based on OPCM referral. States is legally deaf and has a small amount of hearing in 1 ear only. Would like to see if there are any resources for learning ASL. Will look into this. He lives in [place]. He sees cardio, neuro, eye, and pcp is outside of [facility]. Will f/u.       Follow up:   Patient agrees to scheduled follow up call.

## 2024-10-08 ENCOUNTER — PATIENT OUTREACH (OUTPATIENT)
Dept: ADMINISTRATIVE | Facility: OTHER | Age: 64
End: 2024-10-08
Payer: MEDICARE

## 2024-10-08 NOTE — PROGRESS NOTES
CHW - Follow Up    This LPN completed a follow up visit with patient via telephone today.  Pt/Caregiver reported:   Community Health Worker provided: Let patient know that [employer] doesn't cover ASL class. I tried to contact the [facility] at [phone], each extension either is invalid, or unable to leave a message. Will continue to try.   Follow up required:   Follow-up Outreach - Due: 10/10/2024

## 2024-10-10 ENCOUNTER — PATIENT OUTREACH (OUTPATIENT)
Dept: ADMINISTRATIVE | Facility: OTHER | Age: 64
End: 2024-10-10
Payer: MEDICARE

## 2024-10-10 NOTE — PROGRESS NOTES
CHW - Follow Up    This LPN completed a follow up visit with patient via telephone today.  Pt/Caregiver reported:   Community Health Worker provided: Spoke to patient. Let him know the [facility] website has classes for ASL for $100. I am unable to reach the [place] [facility], will continue to try.Tried to call, cannot leave a message. Tried to email, email address is invalid. It doesn't appear there are any other [facility] near him, besides that one. He states he has tried multiple hearing aids in the past. He became emotional when discussing how family gets frustrated that he cannot hear. He states he can read lips quite well. He is able to speak on the phone with me if I speak loudly. He declines any resources for food/utility/housing/transp. Will continue to try the local Deaf center and let him know if I am able to reach them. He is appreciative.   Follow up required:   Follow-up Outreach - Due: 10/16/2024

## 2024-10-11 ENCOUNTER — PATIENT OUTREACH (OUTPATIENT)
Dept: ADMINISTRATIVE | Facility: OTHER | Age: 64
End: 2024-10-11
Payer: MEDICARE

## 2024-10-11 NOTE — PROGRESS NOTES
CHW - Follow Up    This LPN completed a follow up visit with patient via telephone today.  Pt/Caregiver reported:   Community Health Worker provided: Pt called to ask if I knew when his cardiology f/u is with doctor. Informed it is 10/22/24 at 8am.   Follow up required:   Follow-up Outreach - Due: 10/17/2024

## 2024-10-17 ENCOUNTER — PATIENT OUTREACH (OUTPATIENT)
Dept: ADMINISTRATIVE | Facility: OTHER | Age: 64
End: 2024-10-17
Payer: MEDICARE

## 2024-10-17 NOTE — PROGRESS NOTES
CHW - Follow Up    This LPN completed a follow up visit with patient via telephone today.  Pt/Caregiver reported:   Community Health Worker provided: Still unable to reach anyone at [facility]. Called [facility] on aging and they do not have any info on the [facility].   Follow up required:   Follow-up Outreach - Due: 10/31/2024

## 2024-10-31 ENCOUNTER — PATIENT OUTREACH (OUTPATIENT)
Dept: ADMINISTRATIVE | Facility: OTHER | Age: 64
End: 2024-10-31
Payer: MEDICARE

## 2024-11-06 ENCOUNTER — PATIENT OUTREACH (OUTPATIENT)
Dept: ADMINISTRATIVE | Facility: OTHER | Age: 64
End: 2024-11-06
Payer: MEDICARE

## 2024-11-06 NOTE — PROGRESS NOTES
CHW - Case Closure    This LPN spoke to patient via telephone today.   Pt/Caregiver reported: Not able to reach anyone at [facility]. Pt aware, he declines further assistance such as looking into [facility]. Pt denies any other needs at this time.   Pt/Caregiver denied any additional needs at this time and agrees with episode closure at this time.  Provided patient with Community Health Worker's contact information and encouraged him/her to contact this Community Health Worker if additional needs arise.